# Patient Record
Sex: MALE
[De-identification: names, ages, dates, MRNs, and addresses within clinical notes are randomized per-mention and may not be internally consistent; named-entity substitution may affect disease eponyms.]

---

## 2024-07-24 ENCOUNTER — DASHBOARD ENCOUNTERS (OUTPATIENT)
Age: 74
End: 2024-07-24

## 2024-07-24 ENCOUNTER — OFFICE VISIT (OUTPATIENT)
Dept: URBAN - METROPOLITAN AREA CLINIC 35 | Facility: CLINIC | Age: 74
End: 2024-07-24
Payer: MEDICARE

## 2024-07-24 VITALS
HEART RATE: 112 BPM | DIASTOLIC BLOOD PRESSURE: 60 MMHG | WEIGHT: 181 LBS | HEIGHT: 70 IN | BODY MASS INDEX: 25.91 KG/M2 | OXYGEN SATURATION: 96 % | SYSTOLIC BLOOD PRESSURE: 112 MMHG

## 2024-07-24 DIAGNOSIS — Z12.11 SCREENING FOR COLON CANCER: ICD-10-CM

## 2024-07-24 DIAGNOSIS — R74.8 ELEVATED ALKALINE PHOSPHATASE LEVEL: ICD-10-CM

## 2024-07-24 DIAGNOSIS — D64.89 ANEMIA DUE TO OTHER CAUSE: ICD-10-CM

## 2024-07-24 PROCEDURE — 99204 OFFICE O/P NEW MOD 45 MIN: CPT | Performed by: STUDENT IN AN ORGANIZED HEALTH CARE EDUCATION/TRAINING PROGRAM

## 2024-07-24 RX ORDER — TAMSULOSIN HYDROCHLORIDE 0.4 MG/1
CAPSULE ORAL
Qty: 90 CAPSULE | Status: ACTIVE | COMMUNITY

## 2024-07-24 RX ORDER — METOPROLOL TARTRATE 25 MG/1
TAKE 1 TABLET BY MOUTH EVERY DAY TABLET, FILM COATED ORAL
Qty: 90 EACH | Refills: 0 | Status: ACTIVE | COMMUNITY

## 2024-07-24 NOTE — HPI-TODAY'S VISIT:
74 year old patient presents today for anemia evaluation. First time told had anemia was around 2-3 monthsl. No blood in stools. No black stools. Taking iron stools are darker on iron. No nausea/emesis.  Reports joints pains from arthritis. No new bone pains. No unintentional weight loss. Reports intentional weight loss 20 lbs over 9 months. Denies significant cardiac history. Denies chest pain. No shortness of breath. Reports saw cardiologist recently for murmur, pending ECHO. He has elevated PSA, alk phosphatase, anemia on labs. Pending urology and oncology follow up.   Outside labs reviewed Previous colonoscopy 1980-pt not sure for what it was for Denies previous EGD No recent imaging done at this time.   Labs 7.9.24 WHITE BLOOD CELL COUNT 7.6  HEMOGLOBIN 7.7 Low  HEMATOCRIT 24.9 Low MCV 88.3  MCH  27.3   MCHC  30.9 Low  RDW  17.3 H  PLATELET COUNT 471 High MPV  9.5  FERRITIN 793 High PSA, TOTAL 1721.13 High  ALBUMIN  2.5 Low GLOBULIN 4.2 High ALBUMIN/GLOBULIN RATIO0.8 Low  BILIRUBIN, TOTAL  0.3  ALKALINE PHOSPHATASE 431 High  AST14  ALT 8 Low CHOLESTEROL, TOTAL  126   TRIGLYCERIDES  89  CHOL/HDLC RATIO  3.7   IRON, TOTAL 38 Low IRON BINDING CAPACITY  218 Low SATURATION (2502-3) 17 Low  Labs 4-8-2024 Hemoglobin 8.2 WBC 8.5 Platelets 412 (H) AST 14, ALT 6, alkaline phosphatase 441, total bilirubin 0.4 Ferritin 862, iron saturation 10, TIBC 232, iron 24 7-9-2024 protein electrophoresis Interpretation-normal serum protein electrophoresis pattern